# Patient Record
Sex: MALE | Race: WHITE | Employment: FULL TIME | URBAN - METROPOLITAN AREA
[De-identification: names, ages, dates, MRNs, and addresses within clinical notes are randomized per-mention and may not be internally consistent; named-entity substitution may affect disease eponyms.]

---

## 2021-11-02 ENCOUNTER — HOSPITAL ENCOUNTER (OUTPATIENT)
Age: 41
Discharge: HOME | End: 2021-11-02
Payer: COMMERCIAL

## 2021-11-02 VITALS
DIASTOLIC BLOOD PRESSURE: 85 MMHG | HEART RATE: 78 BPM | OXYGEN SATURATION: 96 % | RESPIRATION RATE: 18 BRPM | WEIGHT: 211 LBS | BODY MASS INDEX: 28.58 KG/M2 | HEIGHT: 72 IN | TEMPERATURE: 100 F | SYSTOLIC BLOOD PRESSURE: 155 MMHG

## 2021-11-02 DIAGNOSIS — R50.9 FEVER: ICD-10-CM

## 2021-11-02 DIAGNOSIS — Z20.822 CLOSE EXPOSURE TO COVID-19 VIRUS: ICD-10-CM

## 2021-11-02 DIAGNOSIS — B34.9 VIRAL SYNDROME: Primary | ICD-10-CM

## 2021-11-02 PROCEDURE — U0002 COVID-19 LAB TEST NON-CDC: HCPCS | Performed by: PHYSICIAN ASSISTANT

## 2021-11-02 PROCEDURE — 87880 STREP A ASSAY W/OPTIC: CPT | Performed by: PHYSICIAN ASSISTANT

## 2021-11-02 PROCEDURE — 99203 OFFICE O/P NEW LOW 30 MIN: CPT | Performed by: PHYSICIAN ASSISTANT

## 2021-11-02 RX ORDER — LEVOCETIRIZINE DIHYDROCHLORIDE 5 MG/1
5 TABLET, FILM COATED ORAL EVERY EVENING
COMMUNITY

## 2021-11-02 RX ORDER — IBUPROFEN 600 MG/1
600 TABLET ORAL ONCE
Status: COMPLETED | OUTPATIENT
Start: 2021-11-02 | End: 2021-11-02

## 2021-11-02 NOTE — ED INITIAL ASSESSMENT (HPI)
Pt presents with chills, bodyaches and sorethroat starting this am, r ear ringing over last few weeks

## 2021-11-02 NOTE — ED PROVIDER NOTES
Patient Seen in: [facility] [address] [place], [place] [address]      History   Patient presents with:  Sore Throat    Stated Complaint: SORE THROAT/CHILLS    Subjective:   HPI    29-year-old male presents to the IC for evaluation of possible Covid.  Has chills, sore thr Pulmonary:      Effort: Pulmonary effort is normal.      Breath sounds: Normal breath sounds. Musculoskeletal:         General: Normal range of motion. Cervical back: Normal range of motion and neck supple.    Skin:     General: Skin is warm and dr

## 2022-11-03 ENCOUNTER — OFFICE VISIT (OUTPATIENT)
Dept: INTERNAL MEDICINE CLINIC | Facility: CLINIC | Age: 42
End: 2022-11-03
Payer: COMMERCIAL

## 2022-11-03 VITALS
DIASTOLIC BLOOD PRESSURE: 78 MMHG | HEIGHT: 70 IN | RESPIRATION RATE: 16 BRPM | SYSTOLIC BLOOD PRESSURE: 146 MMHG | WEIGHT: 246 LBS | TEMPERATURE: 98 F | HEART RATE: 82 BPM | OXYGEN SATURATION: 98 % | BODY MASS INDEX: 35.22 KG/M2

## 2022-11-03 DIAGNOSIS — Z13.29 THYROID DISORDER SCREEN: ICD-10-CM

## 2022-11-03 DIAGNOSIS — I10 ESSENTIAL HYPERTENSION: ICD-10-CM

## 2022-11-03 DIAGNOSIS — J45.20 MILD INTERMITTENT ASTHMA WITHOUT COMPLICATION: ICD-10-CM

## 2022-11-03 DIAGNOSIS — Z13.89 SCREENING FOR GENITOURINARY CONDITION: ICD-10-CM

## 2022-11-03 DIAGNOSIS — Z13.0 SCREENING, IRON DEFICIENCY ANEMIA: ICD-10-CM

## 2022-11-03 DIAGNOSIS — Z23 NEED FOR PNEUMOCOCCAL VACCINE: ICD-10-CM

## 2022-11-03 DIAGNOSIS — Z00.00 ANNUAL PHYSICAL EXAM: Primary | ICD-10-CM

## 2022-11-03 DIAGNOSIS — Z00.00 LABORATORY EXAMINATION ORDERED AS PART OF A ROUTINE GENERAL MEDICAL EXAMINATION: ICD-10-CM

## 2022-11-03 DIAGNOSIS — Z13.220 LIPID SCREENING: ICD-10-CM

## 2022-11-03 PROBLEM — Z86.16 PERSONAL HISTORY OF COVID-19: Status: ACTIVE | Noted: 2022-11-03

## 2022-11-03 PROCEDURE — 90471 IMMUNIZATION ADMIN: CPT | Performed by: INTERNAL MEDICINE

## 2022-11-03 PROCEDURE — 90677 PCV20 VACCINE IM: CPT | Performed by: INTERNAL MEDICINE

## 2022-11-03 PROCEDURE — 3008F BODY MASS INDEX DOCD: CPT | Performed by: INTERNAL MEDICINE

## 2022-11-03 PROCEDURE — 3078F DIAST BP <80 MM HG: CPT | Performed by: INTERNAL MEDICINE

## 2022-11-03 PROCEDURE — 93000 ELECTROCARDIOGRAM COMPLETE: CPT | Performed by: INTERNAL MEDICINE

## 2022-11-03 PROCEDURE — 99386 PREV VISIT NEW AGE 40-64: CPT | Performed by: INTERNAL MEDICINE

## 2022-11-03 PROCEDURE — 3077F SYST BP >= 140 MM HG: CPT | Performed by: INTERNAL MEDICINE

## 2022-11-03 RX ORDER — OMEPRAZOLE 20 MG/1
20 CAPSULE, DELAYED RELEASE ORAL
COMMUNITY

## 2022-11-03 RX ORDER — ALBUTEROL SULFATE 90 UG/1
2 AEROSOL, METERED RESPIRATORY (INHALATION) EVERY 4 HOURS PRN
Qty: 1 EACH | Refills: 0 | COMMUNITY
Start: 2022-11-03

## 2022-11-03 RX ORDER — LISINOPRIL 10 MG/1
10 TABLET ORAL DAILY
COMMUNITY
Start: 2022-08-16 | End: 2022-11-03

## 2022-11-03 RX ORDER — OLMESARTAN MEDOXOMIL 20 MG/1
20 TABLET ORAL DAILY
Qty: 90 TABLET | Refills: 1 | Status: SHIPPED | OUTPATIENT
Start: 2022-11-03

## 2023-04-14 ENCOUNTER — OFFICE VISIT (OUTPATIENT)
Dept: INTERNAL MEDICINE CLINIC | Facility: CLINIC | Age: 43
End: 2023-04-14
Payer: COMMERCIAL

## 2023-04-14 VITALS
BODY MASS INDEX: 34.3 KG/M2 | HEART RATE: 62 BPM | HEIGHT: 71 IN | SYSTOLIC BLOOD PRESSURE: 134 MMHG | TEMPERATURE: 98 F | RESPIRATION RATE: 18 BRPM | DIASTOLIC BLOOD PRESSURE: 88 MMHG | OXYGEN SATURATION: 97 % | WEIGHT: 245 LBS

## 2023-04-14 DIAGNOSIS — I10 ESSENTIAL HYPERTENSION: ICD-10-CM

## 2023-04-14 DIAGNOSIS — J34.2 DEVIATED NASAL SEPTUM: ICD-10-CM

## 2023-04-14 DIAGNOSIS — R06.83 SNORING: Primary | ICD-10-CM

## 2023-04-14 DIAGNOSIS — J30.2 SEASONAL ALLERGIES: ICD-10-CM

## 2023-04-14 PROCEDURE — 3079F DIAST BP 80-89 MM HG: CPT | Performed by: PHYSICIAN ASSISTANT

## 2023-04-14 PROCEDURE — 3008F BODY MASS INDEX DOCD: CPT | Performed by: PHYSICIAN ASSISTANT

## 2023-04-14 PROCEDURE — 99214 OFFICE O/P EST MOD 30 MIN: CPT | Performed by: PHYSICIAN ASSISTANT

## 2023-04-14 PROCEDURE — 3075F SYST BP GE 130 - 139MM HG: CPT | Performed by: PHYSICIAN ASSISTANT

## 2023-04-14 RX ORDER — OLMESARTAN MEDOXOMIL 20 MG/1
20 TABLET ORAL DAILY
Qty: 90 TABLET | Refills: 1 | Status: SHIPPED | OUTPATIENT
Start: 2023-04-14

## 2023-04-27 ENCOUNTER — LABORATORY ENCOUNTER (OUTPATIENT)
Dept: LAB | Age: 43
End: 2023-04-27
Attending: INTERNAL MEDICINE
Payer: COMMERCIAL

## 2023-04-27 DIAGNOSIS — Z13.89 SCREENING FOR GENITOURINARY CONDITION: ICD-10-CM

## 2023-04-27 DIAGNOSIS — Z00.00 LABORATORY EXAMINATION ORDERED AS PART OF A ROUTINE GENERAL MEDICAL EXAMINATION: ICD-10-CM

## 2023-04-27 DIAGNOSIS — Z13.220 LIPID SCREENING: ICD-10-CM

## 2023-04-27 DIAGNOSIS — Z13.29 THYROID DISORDER SCREEN: ICD-10-CM

## 2023-04-27 DIAGNOSIS — Z13.0 SCREENING, IRON DEFICIENCY ANEMIA: ICD-10-CM

## 2023-04-27 DIAGNOSIS — I10 ESSENTIAL HYPERTENSION: ICD-10-CM

## 2023-04-27 LAB
ALBUMIN SERPL-MCNC: 4 G/DL (ref 3.4–5)
ALBUMIN/GLOB SERPL: 1.3 {RATIO} (ref 1–2)
ALP LIVER SERPL-CCNC: 61 U/L
ALT SERPL-CCNC: 63 U/L
ANION GAP SERPL CALC-SCNC: 6 MMOL/L (ref 0–18)
AST SERPL-CCNC: 25 U/L (ref 15–37)
BASOPHILS # BLD AUTO: 0.04 X10(3) UL (ref 0–0.2)
BASOPHILS NFR BLD AUTO: 0.8 %
BILIRUB SERPL-MCNC: 0.4 MG/DL (ref 0.1–2)
BILIRUB UR QL STRIP.AUTO: NEGATIVE
BUN BLD-MCNC: 19 MG/DL (ref 7–18)
CALCIUM BLD-MCNC: 9.3 MG/DL (ref 8.5–10.1)
CHLORIDE SERPL-SCNC: 112 MMOL/L (ref 98–112)
CHOLEST SERPL-MCNC: 108 MG/DL (ref ?–200)
CLARITY UR REFRACT.AUTO: CLEAR
CO2 SERPL-SCNC: 23 MMOL/L (ref 21–32)
COLOR UR AUTO: YELLOW
CREAT BLD-MCNC: 0.93 MG/DL
EOSINOPHIL # BLD AUTO: 0.1 X10(3) UL (ref 0–0.7)
EOSINOPHIL NFR BLD AUTO: 1.9 %
ERYTHROCYTE [DISTWIDTH] IN BLOOD BY AUTOMATED COUNT: 12.8 %
EST. AVERAGE GLUCOSE BLD GHB EST-MCNC: 111 MG/DL (ref 68–126)
FASTING PATIENT LIPID ANSWER: YES
FASTING STATUS PATIENT QL REPORTED: YES
GFR SERPLBLD BASED ON 1.73 SQ M-ARVRAT: 106 ML/MIN/1.73M2 (ref 60–?)
GLOBULIN PLAS-MCNC: 3.2 G/DL (ref 2.8–4.4)
GLUCOSE BLD-MCNC: 93 MG/DL (ref 70–99)
GLUCOSE UR STRIP.AUTO-MCNC: NEGATIVE MG/DL
HBA1C MFR BLD: 5.5 % (ref ?–5.7)
HCT VFR BLD AUTO: 44.7 %
HDLC SERPL-MCNC: 45 MG/DL (ref 40–59)
HGB BLD-MCNC: 15.3 G/DL
IMM GRANULOCYTES # BLD AUTO: 0.03 X10(3) UL (ref 0–1)
IMM GRANULOCYTES NFR BLD: 0.6 %
LDLC SERPL CALC-MCNC: 53 MG/DL (ref ?–100)
LEUKOCYTE ESTERASE UR QL STRIP.AUTO: NEGATIVE
LYMPHOCYTES # BLD AUTO: 2.01 X10(3) UL (ref 1–4)
LYMPHOCYTES NFR BLD AUTO: 38.8 %
MCH RBC QN AUTO: 31.3 PG (ref 26–34)
MCHC RBC AUTO-ENTMCNC: 34.2 G/DL (ref 31–37)
MCV RBC AUTO: 91.4 FL
MONOCYTES # BLD AUTO: 0.45 X10(3) UL (ref 0.1–1)
MONOCYTES NFR BLD AUTO: 8.7 %
NEUTROPHILS # BLD AUTO: 2.55 X10 (3) UL (ref 1.5–7.7)
NEUTROPHILS # BLD AUTO: 2.55 X10(3) UL (ref 1.5–7.7)
NEUTROPHILS NFR BLD AUTO: 49.2 %
NITRITE UR QL STRIP.AUTO: NEGATIVE
NONHDLC SERPL-MCNC: 63 MG/DL (ref ?–130)
OSMOLALITY SERPL CALC.SUM OF ELEC: 294 MOSM/KG (ref 275–295)
PH UR STRIP.AUTO: 5 [PH] (ref 5–8)
PLATELET # BLD AUTO: 228 10(3)UL (ref 150–450)
POTASSIUM SERPL-SCNC: 4.5 MMOL/L (ref 3.5–5.1)
PROT SERPL-MCNC: 7.2 G/DL (ref 6.4–8.2)
PROT UR STRIP.AUTO-MCNC: NEGATIVE MG/DL
RBC # BLD AUTO: 4.89 X10(6)UL
RBC UR QL AUTO: NEGATIVE
SODIUM SERPL-SCNC: 141 MMOL/L (ref 136–145)
SP GR UR STRIP.AUTO: 1.02 (ref 1–1.03)
TRIGL SERPL-MCNC: 39 MG/DL (ref 30–149)
TSI SER-ACNC: 1.53 MIU/ML (ref 0.36–3.74)
UROBILINOGEN UR STRIP.AUTO-MCNC: <2 MG/DL
VLDLC SERPL CALC-MCNC: 6 MG/DL (ref 0–30)
WBC # BLD AUTO: 5.2 X10(3) UL (ref 4–11)

## 2023-04-27 PROCEDURE — 82088 ASSAY OF ALDOSTERONE: CPT

## 2023-04-27 PROCEDURE — 80061 LIPID PANEL: CPT

## 2023-04-27 PROCEDURE — 80053 COMPREHEN METABOLIC PANEL: CPT

## 2023-04-27 PROCEDURE — 36415 COLL VENOUS BLD VENIPUNCTURE: CPT

## 2023-04-27 PROCEDURE — 85025 COMPLETE CBC W/AUTO DIFF WBC: CPT

## 2023-04-27 PROCEDURE — 84443 ASSAY THYROID STIM HORMONE: CPT

## 2023-04-27 PROCEDURE — 81003 URINALYSIS AUTO W/O SCOPE: CPT

## 2023-04-27 PROCEDURE — 83036 HEMOGLOBIN GLYCOSYLATED A1C: CPT

## 2023-04-27 PROCEDURE — 84244 ASSAY OF RENIN: CPT

## 2023-05-02 LAB — ALDOSTERONE: 5.3 NG/DL

## 2023-05-10 LAB
ALDOST/RENIN RATIO: 5
ALDOSTERONE: 5.6 NG/DL
RENIN ACTIVITY: 1.12 NG/ML/HR

## 2023-05-12 ENCOUNTER — OFFICE VISIT (OUTPATIENT)
Facility: LOCATION | Age: 43
End: 2023-05-12
Payer: COMMERCIAL

## 2023-05-12 DIAGNOSIS — J34.2 DEVIATED NASAL SEPTUM: Primary | ICD-10-CM

## 2023-05-12 PROCEDURE — 99204 OFFICE O/P NEW MOD 45 MIN: CPT | Performed by: OTOLARYNGOLOGY

## 2023-05-12 RX ORDER — FLUTICASONE PROPIONATE 50 MCG
2 SPRAY, SUSPENSION (ML) NASAL DAILY
Qty: 16 G | Refills: 3 | Status: SHIPPED | OUTPATIENT
Start: 2023-05-12

## 2023-11-30 ENCOUNTER — HOSPITAL ENCOUNTER (OUTPATIENT)
Age: 43
Discharge: HOME | End: 2023-11-30
Payer: COMMERCIAL

## 2023-11-30 VITALS
WEIGHT: 195 LBS | HEART RATE: 94 BPM | OXYGEN SATURATION: 97 % | TEMPERATURE: 98 F | BODY MASS INDEX: 26.41 KG/M2 | SYSTOLIC BLOOD PRESSURE: 142 MMHG | DIASTOLIC BLOOD PRESSURE: 96 MMHG | HEIGHT: 72 IN | RESPIRATION RATE: 16 BRPM

## 2023-11-30 DIAGNOSIS — J02.9 SORE THROAT: Primary | ICD-10-CM

## 2023-11-30 LAB — S PYO AG THROAT QL: NEGATIVE

## 2023-11-30 PROCEDURE — 99213 OFFICE O/P EST LOW 20 MIN: CPT | Performed by: PHYSICIAN ASSISTANT

## 2023-11-30 PROCEDURE — 87880 STREP A ASSAY W/OPTIC: CPT | Performed by: PHYSICIAN ASSISTANT

## 2023-11-30 RX ORDER — DEXAMETHASONE SODIUM PHOSPHATE 4 MG/ML
10 INJECTION, SOLUTION INTRA-ARTICULAR; INTRALESIONAL; INTRAMUSCULAR; INTRAVENOUS; SOFT TISSUE ONCE
Status: COMPLETED | OUTPATIENT
Start: 2023-11-30 | End: 2023-11-30

## 2023-12-23 DIAGNOSIS — I10 ESSENTIAL HYPERTENSION: ICD-10-CM

## 2023-12-26 RX ORDER — OLMESARTAN MEDOXOMIL 20 MG/1
20 TABLET ORAL DAILY
Qty: 30 TABLET | Refills: 0 | Status: SHIPPED | OUTPATIENT
Start: 2023-12-26

## 2023-12-26 NOTE — TELEPHONE ENCOUNTER
Last time medication was refilled 04/14/2023  Quantity and # of refills 90 w 1  Last OV 04/14/2023  Next OV No appointment scheduled      Failed protocol.      Patient due for physical  to coordinate     Sent to [employer] for approval.

## 2023-12-27 NOTE — H&P
HPI:     Jaden Moore III is a 43 year old male with a PMH of asthma, HTN who presents as a consult for vasectomy.    PCP - Nichols    Wife is an urgent care nurse.    Number of children: 2    He desires permanent sterility via bilateral vasectomy. He understands the risks of bleeding, infection, chronic pain, atrophy, the one in 1000 risk of recanalization. He understands that he should continue to use contraception until we have a semen sample showing no sperm present. He also understands the need to wear a scrotal supporter for a minimum of one week. He also understands that he is not to lift anything heavier than 10 pounds for the next 4 days.  Patient was given vasectomy information and office protocol pre-operative and post-operative instructions.      exam: easily palpable vas bilaterally. Uncircumcised. No abnormalities    Schedule for vasectomy.    HISTORY:  Past Medical History:   Diagnosis Date    Asthma     Essential hypertension       History reviewed. No pertinent surgical history.   Family History   Problem Relation Age of Onset    Hypertension Father     Other (atrial fib) Father       Social History:   Social History     Socioeconomic History    Marital status:    Tobacco Use    Smoking status: Former     Passive exposure: Never    Smokeless tobacco: Never   Vaping Use    Vaping Use: Never used   Substance and Sexual Activity    Alcohol use: Yes     Comment: rare    Drug use: Yes     Types: Cannabis        Medications (Active prior to today's visit):  Current Outpatient Medications   Medication Sig Dispense Refill    Omeprazole 10 MG Oral Capsule Delayed Release       diazePAM (VALIUM) 10 MG Oral Tab Take 1 tablet (10 mg total) by mouth See Admin Instructions. Take 1-2 tablets by mouth 20-30 minutes before procedure. 2 tablet 0    traMADol 50 MG Oral Tab Take 1 tablet (50 mg total) by mouth every 6 (six) hours as needed for Pain. 15 tablet 0    olmesartan 20 MG Oral Tab Take 1  tablet (20 mg total) by mouth daily. 30 tablet 0    levocetirizine 5 MG Oral Tab Take 1 tablet (5 mg total) by mouth every evening.      fluticasone propionate 50 MCG/ACT Nasal Suspension 2 sprays by Nasal route daily. (Patient not taking: Reported on 11/30/2023) 16 g 3    albuterol (PROAIR HFA) 108 (90 Base) MCG/ACT Inhalation Aero Soln Inhale 2 puffs into the lungs every 4 (four) hours as needed for Wheezing. (Patient not taking: Reported on 11/30/2023) 1 each 0       Allergies:  No Known Allergies      ROS:     A comprehensive 10 point review of systems was completed.  Pertinent positives and negatives noted in the the HPI.    PHYSICAL EXAM:     GENERAL APPEARANCE: well, developed, well nourished, in no acute distress  NEUROLOGIC: nonfocal, alert and oriented  HEAD: normocephalic, atraumatic  EYES: sclera non-icteric  EARS: hearing intact  ORAL CAVITY: mucosa moist  NECK/THYROID: no obvious goiter or masses  LUNGS: nonlabored breathing  ABDOMEN: soft, no obvious masses or tenderness  SKIN: no obvious rashes    : as noted above     ASSESSMENT/PLAN:   Diagnoses and all orders for this visit:    Encounter for sterilization  -     diazePAM (VALIUM) 10 MG Oral Tab; Take 1 tablet (10 mg total) by mouth See Admin Instructions. Take 1-2 tablets by mouth 20-30 minutes before procedure.  -     traMADol 50 MG Oral Tab; Take 1 tablet (50 mg total) by mouth every 6 (six) hours as needed for Pain.    - as noted above    Thanks again for this consult.    Donis Toussaint MD, FACS  Urologist  [facility]

## 2024-01-08 ENCOUNTER — OFFICE VISIT (OUTPATIENT)
Dept: SURGERY | Facility: CLINIC | Age: 44
End: 2024-01-08
Payer: COMMERCIAL

## 2024-01-08 DIAGNOSIS — Z30.2 ENCOUNTER FOR STERILIZATION: Primary | ICD-10-CM

## 2024-01-08 PROCEDURE — 99203 OFFICE O/P NEW LOW 30 MIN: CPT | Performed by: UROLOGY

## 2024-01-08 RX ORDER — DIAZEPAM 10 MG/1
10 TABLET ORAL SEE ADMIN INSTRUCTIONS
Qty: 2 TABLET | Refills: 0 | Status: SHIPPED | OUTPATIENT
Start: 2024-01-08

## 2024-01-08 RX ORDER — TRAMADOL HYDROCHLORIDE 50 MG/1
50 TABLET ORAL EVERY 6 HOURS PRN
Qty: 15 TABLET | Refills: 0 | Status: SHIPPED | OUTPATIENT
Start: 2024-01-08

## 2024-01-08 RX ORDER — OMEPRAZOLE 10 MG/1
CAPSULE, DELAYED RELEASE ORAL
COMMUNITY

## 2024-01-16 DIAGNOSIS — I10 ESSENTIAL HYPERTENSION: ICD-10-CM

## 2024-01-16 RX ORDER — OLMESARTAN MEDOXOMIL 20 MG/1
20 TABLET ORAL DAILY
Qty: 30 TABLET | Refills: 0 | Status: SHIPPED | OUTPATIENT
Start: 2024-01-16

## 2024-01-16 NOTE — TELEPHONE ENCOUNTER
Last time medication was refilled 12/26/2023  Quantity and # of refills 30 w 0  Last OV 04/14/2023  Next OV   Future Appointments   Date Time Provider Department Center   2/2/2024 10:15 AM Donis Toussaint MD [id number]     Patient due for physical  to coordinate     Sent to [facility] for approval.     Failed protocol.

## 2024-01-19 ENCOUNTER — TELEPHONE (OUTPATIENT)
Dept: INTERNAL MEDICINE CLINIC | Facility: CLINIC | Age: 44
End: 2024-01-19

## 2024-01-25 NOTE — PROGRESS NOTES
HPI:     Jaden Moore III is a 43 year old male with a PMH of asthma, HTN who presents as a consult for vasectomy.    PCP - Nichols    Wife is an urgent care nurse.    Number of children: 2    He presents today for office vasectomy.  Procedure performed without issue. I reminded him to use contraception until after we verify he has a negative semen sample which he will submit in ~ 90 days.      PROCEDURE NOTE    PREOPERATIVE DIAGNOSIS: Desires voluntary sterilization - bilateral vasectomy.    POSTOPERATIVE DIAGNOSIS: same    PROCEDURE PERFORMED: Bilateral Vasectomy    ANESTHESIA: 1-% lidocaine without epinephrine injectable.     INDICATIONS:  He desires permanent sterility via bilateral vasectomy. He understands the risks of bleeding, infection, chronic pain, atrophy, the one in 1000 risk of recanalization. He understands that he should continue to use contraception until we have a semen sample showing no sperm present. He also understands the need to wear a scrotal supporter for a minimum of one week. He also understands that he is not to lift anything heavier than 10 pounds for the next 4 days.  Patient was given vasectomy information and office protocol pre-operative and post-operative instructions.     DESCRIPTION OF THE PROCEDURE:      He was placed in the supine position, prepped and draped in usual sterile fashion using Betadine. 2% lidocaine without epinephrine was used for local anesthetic. A bilateral vasectomy was performed. A small (~ 3 mm) skin incision was made overlying the vas bilaterally. A 1 to 1.5 cm. segment of the vas deferens was removed from each side, taking care not to damage any surrounding tissue. The free ends were tied with 3-0 prolene suture and the lumen cauterized with the cautery. Once no undue bleeding was noted, the ends returned to their normal anatomic location, and the skin edges were closed with 3-0 chromic sutures as well as dermabond glue. He was advised not to have  unprotected intercourse until we have obtained a negative semen analysis per office protocol explained in post-operative instructions.     The patient tolerated the procedure well and left in satisfactory condition without complaints & reminded to wear his athletic supporter for a minimum of 1 week.  __________________________________________    Prior note:    He desires permanent sterility via bilateral vasectomy. He understands the risks of bleeding, infection, chronic pain, atrophy, the one in 1000 risk of recanalization. He understands that he should continue to use contraception until we have a semen sample showing no sperm present. He also understands the need to wear a scrotal supporter for a minimum of one week. He also understands that he is not to lift anything heavier than 10 pounds for the next 4 days.  Patient was given vasectomy information and office protocol pre-operative and post-operative instructions.      exam: easily palpable vas bilaterally. Uncircumcised. No abnormalities    Schedule for vasectomy.    HISTORY:  Past Medical History:   Diagnosis Date    Asthma     Essential hypertension       History reviewed. No pertinent surgical history.   Family History   Problem Relation Age of Onset    Hypertension Father     Other (atrial fib) Father       Social History:   Social History     Socioeconomic History    Marital status:    Tobacco Use    Smoking status: Former     Passive exposure: Never    Smokeless tobacco: Never   Vaping Use    Vaping Use: Never used   Substance and Sexual Activity    Alcohol use: Yes     Comment: rare    Drug use: Yes     Types: Cannabis        Medications (Active prior to today's visit):  Current Outpatient Medications   Medication Sig Dispense Refill    OLMESARTAN 20 MG Oral Tab TAKE 1 TABLET BY MOUTH EVERY DAY 30 tablet 0    Omeprazole 10 MG Oral Capsule Delayed Release       diazePAM (VALIUM) 10 MG Oral Tab Take 1 tablet (10 mg total) by mouth See Admin  Instructions. Take 1-2 tablets by mouth 20-30 minutes before procedure. 2 tablet 0    traMADol 50 MG Oral Tab Take 1 tablet (50 mg total) by mouth every 6 (six) hours as needed for Pain. 15 tablet 0    fluticasone propionate 50 MCG/ACT Nasal Suspension 2 sprays by Nasal route daily. (Patient not taking: Reported on 11/30/2023) 16 g 3    albuterol (PROAIR HFA) 108 (90 Base) MCG/ACT Inhalation Aero Soln Inhale 2 puffs into the lungs every 4 (four) hours as needed for Wheezing. (Patient not taking: Reported on 11/30/2023) 1 each 0    levocetirizine 5 MG Oral Tab Take 1 tablet (5 mg total) by mouth every evening.         Allergies:  No Known Allergies      ROS:     A comprehensive 10 point review of systems was completed.  Pertinent positives and negatives noted in the the HPI.    PHYSICAL EXAM:     GENERAL APPEARANCE: well, developed, well nourished, in no acute distress  NEUROLOGIC: nonfocal, alert and oriented  HEAD: normocephalic, atraumatic  EYES: sclera non-icteric  EARS: hearing intact  ORAL CAVITY: mucosa moist  NECK/THYROID: no obvious goiter or masses  LUNGS: nonlabored breathing  ABDOMEN: soft, no obvious masses or tenderness  SKIN: no obvious rashes    : as noted above     ASSESSMENT/PLAN:   Diagnoses and all orders for this visit:    Encounter for sterilization  -     Semen Analysis, Postvas; Future  -     REMOVAL OF SPERM DUCT(S)    - as noted above    Thanks again for this consult.    Donis Toussaint MD, FACS  Urologist  [facility]

## 2024-02-01 ENCOUNTER — TELEPHONE (OUTPATIENT)
Dept: SURGERY | Facility: CLINIC | Age: 44
End: 2024-02-01

## 2024-02-01 NOTE — TELEPHONE ENCOUNTER
Per wife needs direction for valium, pt's procedure is tomorrow. Per wife if no answer please send direction via [employer]. Please advise

## 2024-02-02 ENCOUNTER — PROCEDURE (OUTPATIENT)
Dept: SURGERY | Facility: CLINIC | Age: 44
End: 2024-02-02
Payer: COMMERCIAL

## 2024-02-02 VITALS — SYSTOLIC BLOOD PRESSURE: 136 MMHG | DIASTOLIC BLOOD PRESSURE: 81 MMHG

## 2024-02-02 DIAGNOSIS — Z30.2 ENCOUNTER FOR STERILIZATION: Primary | ICD-10-CM

## 2024-02-02 PROCEDURE — 55250 REMOVAL OF SPERM DUCT(S): CPT | Performed by: UROLOGY

## 2024-02-06 ENCOUNTER — TELEPHONE (OUTPATIENT)
Dept: SURGERY | Facility: CLINIC | Age: 44
End: 2024-02-06

## 2024-02-06 NOTE — TELEPHONE ENCOUNTER
Called pt's wife to discuss below, no answer, left message to call back.  [employer] message sent.   Added onto MD's schedule for tomorrow, 2/7/24.

## 2024-02-07 NOTE — TELEPHONE ENCOUNTER
Patient states that he is not able to keep today's 2/7/24 at 2pm, but is able to come in on Friday 2/9/24 anytime after 1:00pm.

## 2024-02-16 ENCOUNTER — OFFICE VISIT (OUTPATIENT)
Dept: INTERNAL MEDICINE CLINIC | Facility: CLINIC | Age: 44
End: 2024-02-16
Payer: COMMERCIAL

## 2024-02-16 VITALS
DIASTOLIC BLOOD PRESSURE: 80 MMHG | RESPIRATION RATE: 14 BRPM | SYSTOLIC BLOOD PRESSURE: 132 MMHG | WEIGHT: 243 LBS | HEIGHT: 72 IN | OXYGEN SATURATION: 93 % | HEART RATE: 88 BPM | BODY MASS INDEX: 32.91 KG/M2 | TEMPERATURE: 98 F

## 2024-02-16 DIAGNOSIS — J45.20 MILD INTERMITTENT ASTHMA WITHOUT COMPLICATION: ICD-10-CM

## 2024-02-16 DIAGNOSIS — Z00.00 ROUTINE PHYSICAL EXAMINATION: Primary | ICD-10-CM

## 2024-02-16 DIAGNOSIS — I10 ESSENTIAL HYPERTENSION: ICD-10-CM

## 2024-02-16 DIAGNOSIS — Z00.00 LABORATORY EXAM ORDERED AS PART OF ROUTINE GENERAL MEDICAL EXAMINATION: ICD-10-CM

## 2024-02-16 PROCEDURE — 99396 PREV VISIT EST AGE 40-64: CPT | Performed by: PHYSICIAN ASSISTANT

## 2024-02-16 RX ORDER — OLMESARTAN MEDOXOMIL 20 MG/1
20 TABLET ORAL DAILY
Qty: 90 TABLET | Refills: 3 | Status: SHIPPED | OUTPATIENT
Start: 2024-02-16

## 2024-02-16 NOTE — PROGRESS NOTES
Jaden Moore III is a 43 year old male.  HPI:   Pt here for f/up on BP. Well controlled on olmesartan, denies side effects  Had vasectomy 2 wks ago, no complications   Quit vaping cannabis, and no alcohol > 2 mos; plans to start exercising again. No other concerns today.   Current Outpatient Medications   Medication Sig Dispense Refill   • olmesartan 20 MG Oral Tab Take 1 tablet (20 mg total) by mouth daily. 90 tablet 3   • Omeprazole 10 MG Oral Capsule Delayed Release      • albuterol (PROAIR HFA) 108 (90 Base) MCG/ACT Inhalation Aero Soln Inhale 2 puffs into the lungs every 4 (four) hours as needed for Wheezing. 1 each 0   • levocetirizine 5 MG Oral Tab Take 1 tablet (5 mg total) by mouth every evening.        Past Medical History:   Diagnosis Date   • Asthma    • Essential hypertension       Social History:  Social History     Socioeconomic History   • Marital status:    Tobacco Use   • Smoking status: Former     Passive exposure: Never   • Smokeless tobacco: Never   Vaping Use   • Vaping Use: Never used   Substance and Sexual Activity   • Alcohol use: Yes     Comment: rare   • Drug use: Yes     Types: Cannabis        REVIEW OF SYSTEMS:   GENERAL HEALTH: feels well otherwise. Denies fever, chills, unintentional weight change  SKIN: denies any unusual skin lesions or rashes  RESPIRATORY: denies shortness of breath with exertion, denies cough or wheezing  CARDIOVASCULAR: denies chest pain or palpitations, denies leg swelling  GI: denies abdominal pain and denies heartburn. Denies nausea, vomiting, diarrhea, constipation  NEURO: denies headaches, dizziness, weakness, syncope    EXAM:   /80   Pulse 88   Temp 98 °F (36.7 °C)   Resp 14   Ht 6' (1.829 m)   Wt 243 lb (110.2 kg)   SpO2 93%   BMI 32.96 kg/m²   GENERAL: well developed, well nourished,in no apparent distress  SKIN: no rashes,no suspicious lesions, warm and dry  HEENT: atraumatic, normocephalic,ears and throat are clear  NECK:  supple,no adenopathy, no thyromegaly  LUNGS: clear to auscultation b/l no W/R/R  CARDIO: RRR without murmur  GI: good BS's,no masses, HSM, distension or tenderness  EXTREMITIES: no cyanosis, clubbing or edema  MUSCULOSKELETAL: FROM, no joint swelling or bony tenderness  NEURO: a/ox3, no focal deficits  PSYCH: mood and affect normal    ASSESSMENT AND PLAN:   1. Routine physical examination (Primary)  2. Essential hypertension  Well controlled continue olmesartan   -     Olmesartan Medoxomil; Take 1 tablet (20 mg total) by mouth daily.  Dispense: 90 tablet; Refill: 3  3. Laboratory exam ordered as part of routine general medical examination  -     CBC With Differential With Platelet; Future; Expected date: 02/16/2024  -     Comp Metabolic Panel (14); Future; Expected date: 02/16/2024  -     Lipid Panel; Future; Expected date: 02/16/2024  -     TSH W Reflex To Free T4; Future; Expected date: 02/16/2024  -     Urinalysis, Routine; Future; Expected date: 02/16/2024  4. Mild intermittent asthma without complication  Well controlled, ACT 25 AAP reviewed continue prn albuterol       The patient indicates understanding of these issues and agrees to the plan.  Return in about 1 year (around 2/16/2025) for routine physical, or sooner as needed.

## 2024-05-01 ENCOUNTER — TELEPHONE (OUTPATIENT)
Dept: SURGERY | Facility: CLINIC | Age: 44
End: 2024-05-01

## 2024-05-01 NOTE — TELEPHONE ENCOUNTER
Per patient states office of Dr. Shah is requesting semen analysis order, please fax to  [phone], needs to include patient's name, phone number, date of birth, needs to include dr shah's name and diagnosis code. Patient has appointment on 5/3 at 8:00am. Patient asking for call back once order is faxed Thank you.

## 2024-05-02 NOTE — TELEPHONE ENCOUNTER
Patient wife stating patient gave wrong info yesterday but the correct info is that patent needs an post vasectomy semen analysis  order prior to the appointment for tomorrow 5/3 not a fertility assessment. Please advise

## 2024-09-13 ENCOUNTER — LAB ENCOUNTER (OUTPATIENT)
Dept: LAB | Age: 44
End: 2024-09-13
Attending: PHYSICIAN ASSISTANT
Payer: COMMERCIAL

## 2024-09-13 DIAGNOSIS — Z00.00 LABORATORY EXAM ORDERED AS PART OF ROUTINE GENERAL MEDICAL EXAMINATION: ICD-10-CM

## 2024-09-13 PROBLEM — Z91.89 10 YEAR RISK OF MI OR STROKE < 7.5%: Status: ACTIVE | Noted: 2024-09-13

## 2024-09-13 LAB
ALBUMIN SERPL-MCNC: 4.4 G/DL (ref 3.2–4.8)
ALBUMIN/GLOB SERPL: 1.7 {RATIO} (ref 1–2)
ALP LIVER SERPL-CCNC: 64 U/L
ALT SERPL-CCNC: 34 U/L
ANION GAP SERPL CALC-SCNC: 6 MMOL/L (ref 0–18)
AST SERPL-CCNC: 22 U/L (ref ?–34)
BASOPHILS # BLD AUTO: 0.01 X10(3) UL (ref 0–0.2)
BASOPHILS NFR BLD AUTO: 0.2 %
BILIRUB SERPL-MCNC: 0.5 MG/DL (ref 0.3–1.2)
BILIRUB UR QL STRIP.AUTO: NEGATIVE
BUN BLD-MCNC: 18 MG/DL (ref 9–23)
CALCIUM BLD-MCNC: 10 MG/DL (ref 8.7–10.4)
CHLORIDE SERPL-SCNC: 109 MMOL/L (ref 98–112)
CHOLEST SERPL-MCNC: 158 MG/DL (ref ?–200)
CLARITY UR REFRACT.AUTO: CLEAR
CO2 SERPL-SCNC: 25 MMOL/L (ref 21–32)
CREAT BLD-MCNC: 0.98 MG/DL
EGFRCR SERPLBLD CKD-EPI 2021: 99 ML/MIN/1.73M2 (ref 60–?)
EOSINOPHIL # BLD AUTO: 0.1 X10(3) UL (ref 0–0.7)
EOSINOPHIL NFR BLD AUTO: 2 %
ERYTHROCYTE [DISTWIDTH] IN BLOOD BY AUTOMATED COUNT: 13 %
FASTING PATIENT LIPID ANSWER: YES
FASTING STATUS PATIENT QL REPORTED: YES
GLOBULIN PLAS-MCNC: 2.6 G/DL (ref 2–3.5)
GLUCOSE BLD-MCNC: 112 MG/DL (ref 70–99)
GLUCOSE UR STRIP.AUTO-MCNC: NORMAL MG/DL
HCT VFR BLD AUTO: 43.2 %
HDLC SERPL-MCNC: 46 MG/DL (ref 40–59)
HGB BLD-MCNC: 14.8 G/DL
IMM GRANULOCYTES # BLD AUTO: 0.02 X10(3) UL (ref 0–1)
IMM GRANULOCYTES NFR BLD: 0.4 %
KETONES UR STRIP.AUTO-MCNC: NEGATIVE MG/DL
LDLC SERPL CALC-MCNC: 97 MG/DL (ref ?–100)
LEUKOCYTE ESTERASE UR QL STRIP.AUTO: NEGATIVE
LYMPHOCYTES # BLD AUTO: 1.62 X10(3) UL (ref 1–4)
LYMPHOCYTES NFR BLD AUTO: 33 %
MCH RBC QN AUTO: 31.6 PG (ref 26–34)
MCHC RBC AUTO-ENTMCNC: 34.3 G/DL (ref 31–37)
MCV RBC AUTO: 92.3 FL
MONOCYTES # BLD AUTO: 0.47 X10(3) UL (ref 0.1–1)
MONOCYTES NFR BLD AUTO: 9.6 %
NEUTROPHILS # BLD AUTO: 2.69 X10 (3) UL (ref 1.5–7.7)
NEUTROPHILS # BLD AUTO: 2.69 X10(3) UL (ref 1.5–7.7)
NEUTROPHILS NFR BLD AUTO: 54.8 %
NITRITE UR QL STRIP.AUTO: NEGATIVE
NONHDLC SERPL-MCNC: 112 MG/DL (ref ?–130)
OSMOLALITY SERPL CALC.SUM OF ELEC: 293 MOSM/KG (ref 275–295)
PH UR STRIP.AUTO: 6.5 [PH] (ref 5–8)
PLATELET # BLD AUTO: 213 10(3)UL (ref 150–450)
POTASSIUM SERPL-SCNC: 4.3 MMOL/L (ref 3.5–5.1)
PROT SERPL-MCNC: 7 G/DL (ref 5.7–8.2)
PROT UR STRIP.AUTO-MCNC: NEGATIVE MG/DL
RBC # BLD AUTO: 4.68 X10(6)UL
RBC UR QL AUTO: NEGATIVE
SODIUM SERPL-SCNC: 140 MMOL/L (ref 136–145)
SP GR UR STRIP.AUTO: 1.02 (ref 1–1.03)
TRIGL SERPL-MCNC: 81 MG/DL (ref 30–149)
TSI SER-ACNC: 1.84 MIU/ML (ref 0.55–4.78)
UROBILINOGEN UR STRIP.AUTO-MCNC: NORMAL MG/DL
VLDLC SERPL CALC-MCNC: 13 MG/DL (ref 0–30)
WBC # BLD AUTO: 4.9 X10(3) UL (ref 4–11)

## 2024-09-13 PROCEDURE — 80053 COMPREHEN METABOLIC PANEL: CPT

## 2024-09-13 PROCEDURE — 36415 COLL VENOUS BLD VENIPUNCTURE: CPT

## 2024-09-13 PROCEDURE — 81003 URINALYSIS AUTO W/O SCOPE: CPT

## 2024-09-13 PROCEDURE — 80061 LIPID PANEL: CPT

## 2024-09-13 PROCEDURE — 84443 ASSAY THYROID STIM HORMONE: CPT

## 2024-09-13 PROCEDURE — 85025 COMPLETE CBC W/AUTO DIFF WBC: CPT

## 2024-11-22 PROCEDURE — 88305 TISSUE EXAM BY PATHOLOGIST: CPT | Performed by: CLINICAL MEDICAL LABORATORY

## 2024-11-23 RX ORDER — LIDOCAINE HYDROCHLORIDE 20 MG/ML
10 SOLUTION OROPHARYNGEAL 4 TIMES DAILY PRN
Qty: 80 ML | Refills: 1 | Status: SHIPPED | OUTPATIENT
Start: 2024-11-23 | End: 2024-11-27

## 2024-11-25 ENCOUNTER — LAB REQUISITION (OUTPATIENT)
Dept: LAB | Age: 44
End: 2024-11-25

## 2024-11-25 DIAGNOSIS — K13.21 LEUKOPLAKIA OF ORAL MUCOSA, INCLUDING TONGUE: ICD-10-CM

## 2024-11-27 LAB
ASR DISCLAIMER: NORMAL
CASE RPRT: NORMAL
CLINICAL INFO: NORMAL
PATH REPORT.FINAL DX SPEC: NORMAL
PATH REPORT.GROSS SPEC: NORMAL

## 2024-12-04 ENCOUNTER — TELEPHONE (OUTPATIENT)
Dept: SURGERY | Facility: CLINIC | Age: 44
End: 2024-12-04

## 2024-12-04 NOTE — TELEPHONE ENCOUNTER
Per patient needs semen analysis order faxed to [phone], was told he was unable to schedule until order is received. Please advise thank you.

## 2024-12-06 ENCOUNTER — LAB ENCOUNTER (OUTPATIENT)
Dept: LAB | Facility: HOSPITAL | Age: 44
End: 2024-12-06
Attending: UROLOGY
Payer: COMMERCIAL

## 2024-12-06 NOTE — TELEPHONE ENCOUNTER
Pt is to proceed to [facility] or [facility].   [employer] message sent.

## 2024-12-06 NOTE — TELEPHONE ENCOUNTER
kali from dr shah's office called.  Pt went to the lab, advised to go to dr. Shah's office.  Pt came in with a semen sample but dr shah does not do post op vasectomy semen analysis.  Pt need clarification on where to drop of the sample.  Please call pt

## 2024-12-27 ENCOUNTER — LAB ENCOUNTER (OUTPATIENT)
Dept: LAB | Facility: HOSPITAL | Age: 44
End: 2024-12-27
Attending: UROLOGY
Payer: COMMERCIAL

## 2024-12-27 DIAGNOSIS — Z30.2 ENCOUNTER FOR STERILIZATION: ICD-10-CM

## 2024-12-27 PROCEDURE — 89310 SEMEN ANALYSIS W/COUNT: CPT

## 2025-02-08 DIAGNOSIS — I10 ESSENTIAL HYPERTENSION: ICD-10-CM

## 2025-02-08 RX ORDER — OLMESARTAN MEDOXOMIL 20 MG/1
20 TABLET ORAL DAILY
Qty: 90 TABLET | Refills: 0 | Status: SHIPPED | OUTPATIENT
Start: 2025-02-08

## 2025-02-08 NOTE — TELEPHONE ENCOUNTER
Last time medication was refilled 2/16/24  Last office visit  2/16/24  Next office visit due/scheduled   Future Appointments   Date Time Provider Department Center   2/25/2025  4:00 PM Caryn Prince PA-C [facility] [address]       Passed protocol, Medication sent.

## 2025-02-25 ENCOUNTER — OFFICE VISIT (OUTPATIENT)
Dept: INTERNAL MEDICINE CLINIC | Facility: CLINIC | Age: 45
End: 2025-02-25
Payer: COMMERCIAL

## 2025-02-25 VITALS
WEIGHT: 254 LBS | TEMPERATURE: 98 F | HEIGHT: 72 IN | HEART RATE: 80 BPM | RESPIRATION RATE: 18 BRPM | OXYGEN SATURATION: 98 % | DIASTOLIC BLOOD PRESSURE: 82 MMHG | BODY MASS INDEX: 34.4 KG/M2 | SYSTOLIC BLOOD PRESSURE: 136 MMHG

## 2025-02-25 DIAGNOSIS — Z00.00 LABORATORY EXAM ORDERED AS PART OF ROUTINE GENERAL MEDICAL EXAMINATION: ICD-10-CM

## 2025-02-25 DIAGNOSIS — R68.89 DIFFICULTY MAINTAINING WEIGHT LOSS: ICD-10-CM

## 2025-02-25 DIAGNOSIS — Z00.00 ROUTINE PHYSICAL EXAMINATION: Primary | ICD-10-CM

## 2025-02-25 DIAGNOSIS — J45.20 MILD INTERMITTENT ASTHMA WITHOUT COMPLICATION (HCC): ICD-10-CM

## 2025-02-25 DIAGNOSIS — I10 ESSENTIAL HYPERTENSION: ICD-10-CM

## 2025-02-25 PROCEDURE — 99396 PREV VISIT EST AGE 40-64: CPT | Performed by: PHYSICIAN ASSISTANT

## 2025-02-25 NOTE — PROGRESS NOTES
Wellness Exam    CC: Patient is presenting for a wellness exam    HPI:   Current Complaints: Patient presents for recheck of his hypertension. Pt has been taking medications as instructed, no medication side effects, home BP monitoring in the range of 130's systolic and 80's diastolic.     C/o difficulty losing weight. Would like testosterone checked. Exercise is regular (more in the summer), diet is balanced     Colon cancer screening:  No recommendations at this time   Prostate cancer screening:  There are no preventive care reminders to display for this patient.     Pertinent Family History:   Family History   Problem Relation Age of Onset    Cancer Mother         lung (smoker)    Hypertension Father     Other (atrial fib) Father     Colon Cancer Maternal Grandfather 75      Past Medical History:    Asthma (HCC)    Essential hypertension     History reviewed. No pertinent surgical history.  Social History     Socioeconomic History    Marital status:    Tobacco Use    Smoking status: Former     Passive exposure: Never    Smokeless tobacco: Never   Vaping Use    Vaping status: Never Used   Substance and Sexual Activity    Alcohol use: Yes     Comment: rare    Drug use: Yes     Types: Cannabis     Medications Ordered Prior to Encounter[1]  Tobacco:  He smoked tobacco in the past but quit greater than 12 months ago.  Social History     Tobacco Use   Smoking Status Former    Passive exposure: Never   Smokeless Tobacco Never           Review of Systems   Constitutional: Negative for fever, chills and fatigue.   HENT: Negative for hearing loss, congestion, sore throat and neck pain.    Eyes: Negative for pain and visual disturbance.   Respiratory: Negative for cough and shortness of breath.    Cardiovascular: Negative for chest pain and palpitations.   Gastrointestinal: Negative for nausea, vomiting, abdominal pain and diarrhea.   Genitourinary: Negative for urgency, frequency and difficulty urinating.    Musculoskeletal: Negative for arthralgias and gait problem.   Skin: Negative for color change and rash.   Neurological: Negative for tremors, weakness and numbness.   Hematological: Negative for adenopathy. Does not bruise/bleed easily.   Psychiatric/Behavioral: Negative for confusion and agitation. The patient is not nervous/anxious.      /82   Pulse 80   Temp 98.2 °F (36.8 °C)   Resp 18   Ht 6' (1.829 m)   Wt 254 lb (115.2 kg)   SpO2 98%   BMI 34.45 kg/m²   Physical Exam   Constitutional: He is oriented to person, place, and time. He appears well-developed. No distress.   HENT: Normocephalic and atraumatic. Nose normal. TMs pearly gray, + light reflex.  Mucous membranes moist, dentition normal.  Oropharynx without erythema, exudate or tonsillar hypertrophy  Eyes: EOM are normal. Pupils are equal, round, and reactive to light. No scleral icterus.   Neck: Normal range of motion. No thyromegaly present.   Cardiovascular: Normal rate, regular rhythm and normal heart sounds.  Exam reveals no friction rub, no murmur heard.  Pulmonary/Chest: Effort normal and breath sounds normal b/l. He has no wheezes or rales.   Abdominal: Soft. Bowel sounds are normal. There is no tenderness. No HSM.  Abdominal aorta normal in size, no hernia appreciated.  Musculoskeletal: Normal range of motion. He exhibits no edema.   Lymphadenopathy: He has no cervical or supraclavicular adenopathy.   Neurological: He is alert and oriented to person, place, and time.  DTRs +2 and symmetric b/l.   Skin: Skin is warm. No rash noted. No erythema, pallor or jaundice.   Psychiatric: He has a normal mood and affect. His behavior is normal.       Assessment and Plan:  Jaden Moore III is a 44 year old male here for a wellness exam  Age appropriate cancer screening, labs, safety, immunizations were discussed with the patient and ordered as follows:  1. Routine physical examination (Primary)  2. Laboratory exam ordered as part of  routine general medical examination  -     CBC With Differential With Platelet; Future; Expected date: 02/25/2025  -     Comp Metabolic Panel (14); Future; Expected date: 02/25/2025  -     Lipid Panel; Future; Expected date: 02/25/2025  -     TSH W Reflex To Free T4; Future; Expected date: 02/25/2025  -     Urinalysis, Routine; Future; Expected date: 02/25/2025  3. Essential hypertension  Borderline, continue olmesartan 20mg daily, pt will monitor BP at home  Check echo for chronic early onset HTN   Pt has CT calcium score scheduled as well for cardiac risk assessment   -     CARD ECHO 2D DOPPLER (CPT=93306); Future; Expected date: 02/25/2025  4. Difficulty maintaining weight loss  -     Testosterone, Total And Free; Future; Expected date: 02/25/2025  -continue regular exercise, balanced diet. Discussed sleep study, will order if snoring becomes more persistent  5. Mild intermittent asthma without complication (HCC)   ACT 25, AAP reviewed. Continue prn albuterol.    Return in about 1 year (around 2/25/2026) for routine physical, or sooner as needed.   Patient/Caregiver Education:  Patient/Caregiver Education: There are no barriers to learning. Medical education done.  Outcome: Patient verbalizes understanding.      Educated by: ARCELIA         [1]   Current Outpatient Medications on File Prior to Visit   Medication Sig Dispense Refill    olmesartan 20 MG Oral Tab TAKE 1 TABLET BY MOUTH EVERY DAY 90 tablet 0    Omeprazole 10 MG Oral Capsule Delayed Release       albuterol (PROAIR HFA) 108 (90 Base) MCG/ACT Inhalation Aero Soln Inhale 2 puffs into the lungs every 4 (four) hours as needed for Wheezing. 1 each 0    levocetirizine 5 MG Oral Tab Take 1 tablet (5 mg total) by mouth every evening.       No current facility-administered medications on file prior to visit.

## 2025-03-01 DIAGNOSIS — J45.20 MILD INTERMITTENT ASTHMA WITHOUT COMPLICATION (HCC): ICD-10-CM

## 2025-03-02 RX ORDER — ALBUTEROL SULFATE 90 UG/1
2 INHALANT RESPIRATORY (INHALATION) EVERY 4 HOURS PRN
Qty: 1 EACH | Refills: 0 | Status: SHIPPED | OUTPATIENT
Start: 2025-03-02

## 2025-03-02 NOTE — TELEPHONE ENCOUNTER
Last time medication was refilled: 11/3/22  Next office visit due/scheduled: no apt  Last office visit: 2/25/25  Last Labs: 9/13/24

## 2025-03-30 ENCOUNTER — HOSPITAL ENCOUNTER (OUTPATIENT)
Dept: CT IMAGING | Age: 45
Discharge: HOME | End: 2025-03-30
Attending: INTERNAL MEDICINE

## 2025-03-30 DIAGNOSIS — Z13.6 SCREENING FOR HEART DISEASE: ICD-10-CM

## 2025-05-10 DIAGNOSIS — I10 ESSENTIAL HYPERTENSION: ICD-10-CM

## 2025-05-10 RX ORDER — OLMESARTAN MEDOXOMIL 20 MG/1
20 TABLET ORAL DAILY
Qty: 90 TABLET | Refills: 0 | Status: SHIPPED | OUTPATIENT
Start: 2025-05-10

## 2025-05-10 NOTE — TELEPHONE ENCOUNTER
Last time medication was refilled 2/8/25  Last office visit  2/25/25  Next office visit due/scheduled No future appointments.  Passed protocol, Medication sent.

## 2025-05-25 ENCOUNTER — HOSPITAL ENCOUNTER (OUTPATIENT)
Age: 45
Discharge: HOME | End: 2025-05-25
Payer: COMMERCIAL

## 2025-05-25 VITALS
HEART RATE: 61 BPM | TEMPERATURE: 98 F | DIASTOLIC BLOOD PRESSURE: 91 MMHG | SYSTOLIC BLOOD PRESSURE: 147 MMHG | RESPIRATION RATE: 16 BRPM | OXYGEN SATURATION: 97 %

## 2025-05-25 DIAGNOSIS — M62.838 MUSCLE SPASMS OF NECK: Primary | ICD-10-CM

## 2025-05-25 RX ORDER — CYCLOBENZAPRINE HCL 10 MG
10 TABLET ORAL 3 TIMES DAILY PRN
Qty: 20 TABLET | Refills: 0 | Status: SHIPPED | OUTPATIENT
Start: 2025-05-25 | End: 2025-06-01

## 2025-05-25 RX ORDER — PREDNISONE 20 MG/1
40 TABLET ORAL DAILY
Qty: 10 TABLET | Refills: 0 | Status: SHIPPED | OUTPATIENT
Start: 2025-05-25 | End: 2025-05-30

## 2025-05-25 NOTE — ED PROVIDER NOTES
Patient Seen in: [facility]        History  Chief Complaint   Patient presents with    Neck Pain     Stated Complaint: neck pain/headache    Subjective:   HPI            44-year-old male, with history of asthma and hypertension, presents to the immediate care with tenderness to right neck after waking 5 days ago.  He states he was cutting the grass but no trauma or injury.  He states he felt as if he slept funny.  He has been taking ibuprofen and states the pain does resolve.  He has pain with looking to the left and a tightening to the muscle.  He does report headache.  No change in vision no chest pain.  No shortness of breath.  No dizziness.  No weakness.      Objective:     Past Medical History:    Asthma (HCC)    Essential hypertension              History reviewed. No pertinent surgical history.             Social History     Socioeconomic History    Marital status:    Tobacco Use    Smoking status: Former     Passive exposure: Never    Smokeless tobacco: Never   Vaping Use    Vaping status: Never Used   Substance and Sexual Activity    Alcohol use: Yes     Comment: rare    Drug use: Not Currently     Types: Cannabis              Review of Systems    Positive for stated complaint: neck pain/headache  Other systems are as noted in HPI.  Constitutional and vital signs reviewed.      All other systems reviewed and negative except as noted above.                  Physical Exam    ED Triage Vitals [05/25/25 1053]   BP (!) 147/91   Pulse 61   Resp 16   Temp 98.2 °F (36.8 °C)   Temp src Oral   SpO2 97 %   O2 Device None (Room air)       Current Vitals:   Vital Signs  BP: (!) 147/91  Pulse: 61  Resp: 16  Temp: 98.2 °F (36.8 °C)  Temp src: Oral    Oxygen Therapy  SpO2: 97 %  O2 Device: None (Room air)            Physical Exam  Vitals and nursing note reviewed.   Constitutional:       Appearance: Normal appearance. He is not ill-appearing.   HENT:      Left Ear: Tympanic membrane normal.    Neck:      Vascular: No carotid bruit.      Comments: Point tender to the proximal splenis capitis  No swelling no rash pain does improve with pressure increase in pain and range of motion to the left  Cardiovascular:      Rate and Rhythm: Normal rate and regular rhythm.      Pulses: Normal pulses.      Heart sounds: Normal heart sounds.   Pulmonary:      Effort: Pulmonary effort is normal.      Breath sounds: Normal breath sounds.   Musculoskeletal:         General: Normal range of motion.      Cervical back: Normal range of motion and neck supple. Tenderness present. No rigidity.   Lymphadenopathy:      Cervical: No cervical adenopathy.   Skin:     General: Skin is warm and dry.      Capillary Refill: Capillary refill takes less than 2 seconds.      Findings: No rash.   Neurological:      General: No focal deficit present.      Mental Status: He is alert and oriented to person, place, and time.      Motor: No weakness.      Gait: Gait normal.                 ED Course  Labs Reviewed - No data to display                         MDM     Point tenderness to the right neck muscle  Torticollis spasm considered bony injury/headache due to neck spasm consider more concerning factors intracranial diagnoses such as TIA embolism  Pain is on movement and improves with Salonpas and ibuprofen  Advised heat, ibuprofen, massage, muscle relaxer if pain persists or worsens advised follow-up in the emergency department patient is aware of the limitations of the immediate care we are unable to image CT versus MRI-he is agreeable declines ER at this time will monitor symptoms and ER if persists      Medical Decision Making  Problems Addressed:  Muscle spasms of neck: acute illness or injury with systemic symptoms    Risk  OTC drugs.  Prescription drug management.        Disposition and Plan     Clinical Impression:  1. Muscle spasms of neck         Disposition:  Discharge  5/25/2025 11:10 am    Follow-up:  Tip Nichols MD  2005  [address] [place] [id number]  [phone]    Schedule an appointment as soon as possible for a visit in 3 days  If symptoms worsen go tot he ER as discussed          Medications Prescribed:  Discharge Medication List as of 5/25/2025 11:12 AM        START taking these medications    Details   cyclobenzaprine 10 MG Oral Tab Take 1 tablet (10 mg total) by mouth 3 (three) times daily as needed for Muscle spasms., Normal, Disp-20 tablet, R-0      predniSONE 20 MG Oral Tab Take 2 tablets (40 mg total) by mouth daily for 5 days., Normal, Disp-10 tablet, R-0                   Supplementary Documentation:

## 2025-05-25 NOTE — ED INITIAL ASSESSMENT (HPI)
Pt woke up 5 days ago with rt sided neck pain, it has gotten a little better, but causes a headache to that rt side.  Improves with medication

## 2025-05-25 NOTE — DISCHARGE INSTRUCTIONS
Ibuprofen ,heat- warm compresses  For any new or worsening symptoms or if symptoms are not improving you need to go to the ER as discussed for higher level of care and imaging

## 2025-08-08 DIAGNOSIS — I10 ESSENTIAL HYPERTENSION: ICD-10-CM

## 2025-08-08 RX ORDER — OLMESARTAN MEDOXOMIL 20 MG/1
20 TABLET ORAL DAILY
Qty: 90 TABLET | Refills: 0 | Status: SHIPPED | OUTPATIENT
Start: 2025-08-08

## (undated) NOTE — LETTER
ASTHMA ACTION PLAN for Pamela Aguilar III     : 1980     Date: 2023  Provider:  Gilberto Acosta PA-C  Phone for doctor or clinic: [facility], [address] [address]  [place]  [id number] [phone]    ACT Score: 25      You can use the colors of a traffic light to help learn about your asthma medicines. 1. Green - Go! % of Personal Best Peak Flow Use controller medicine. Breathing is good  No cough or wheeze  Can work and play Medicine How much to take When to take it          2. Yellow - Caution. 50-79% Personal Best Peak  Flow. Use reliever medicine to keep an asthma attack from getting bad. Cough  Wheezing  Tight Chest  Wake up at night Medicine How much to take When to take it    Albuterol inhaler,  2 puffs every four hours as needed. Additional instructions         3. Red - Stop! Danger!  <50% Personal Best Peak  Flow. Take these medications until  Get help from a doctor   Medicine not helping  Breathing is hard and fast  Nose opens wide  Can't walk  Ribs show  Can't talk well Medicine How much to take When to take it    Albuterol inhaler, 2 puffs NOW      Additional Instructions If your symptoms do not improve and you cannot contact your doctor, [facility] or call 911 immediately! [x] Asthma Action Plan reviewed with patient (and caregiver if necessary) and a copy of the plan was given to the patient/caregiver. [] Asthma Action Plan reviewed with patient (and caregiver if necessary) on the phone and mailed copy to patient or submitted via [address]      Signatures:  Provider  Gilberto Acosta PA-C   Patient Caretaker